# Patient Record
Sex: MALE | Race: WHITE | NOT HISPANIC OR LATINO
[De-identification: names, ages, dates, MRNs, and addresses within clinical notes are randomized per-mention and may not be internally consistent; named-entity substitution may affect disease eponyms.]

---

## 2023-06-26 ENCOUNTER — APPOINTMENT (OUTPATIENT)
Dept: PAIN MANAGEMENT | Facility: CLINIC | Age: 43
End: 2023-06-26
Payer: COMMERCIAL

## 2023-06-26 VITALS — BODY MASS INDEX: 31.5 KG/M2 | HEIGHT: 70 IN | WEIGHT: 220 LBS

## 2023-06-26 PROBLEM — Z00.00 ENCOUNTER FOR PREVENTIVE HEALTH EXAMINATION: Status: ACTIVE | Noted: 2023-06-26

## 2023-06-26 PROCEDURE — 99204 OFFICE O/P NEW MOD 45 MIN: CPT

## 2023-06-26 RX ORDER — METHYLPREDNISOLONE 4 MG/1
4 TABLET ORAL
Qty: 1 | Refills: 0 | Status: ACTIVE | COMMUNITY
Start: 2023-06-26 | End: 1900-01-01

## 2023-06-26 NOTE — DISCUSSION/SUMMARY
[de-identified] : A discussion regarding available pain management treatment options occurred with the patient.  These included interventional, rehabilitative, pharmacological, and alternative modalities. We will proceed with the following:\par \par Rehabilitative options:\par - participation in active HEP was discussed\par \par Medication based treatment options:\par Ordered Medrol dose pack \par \par Complementary treatment options:\par - lifestyle modifications discussed\par \par Image: \par Ordered Xray of the lumbar spine \par \par follow up after image\par \par I, Diane Nye, attest that this documentation has been prepared under the direction and in the presence of Provider Reddy Kapoor DO\par The documentation recorded by the scribe, in my presence, accurately reflects the service I personally performed, and the decisions made by me with my edits as appropriate.\par \par Best Regards, \par Reddy Kapoor D.O.\par \par

## 2023-06-26 NOTE — HISTORY OF PRESENT ILLNESS
[FreeTextEntry1] : HISTORY OF PRESENT ILLNESS: Mr. Boykin is a 43-year-old male complaining of low back pain. The pain started 3 days ago when he was standing up from a seated position. He reports of right low back pain that is localized and feels like a muscle spasm. The patient denies any radiating pain down legs. He states the pain is worse when standing. No pain when sitting and laying down. Patient describes the pain as severe.  During the last month the pain has been nearly constant with symptoms worsening in no typical pattern. Pain described as sharp. Pain is increased with standing, walking, exercise, coughing sneezing.  Pain is decreased with lying down, sitting, relaxation.  Pain is not changed with bowel movements.  Bowel or bladder habits have not changed. He managed this pain with Ibuprofen and Aleve. Today he rates his pain 8/10 on the pain scale \par \par ACTIVITIES: Patient could walk less than one block before the pain starts.  Patient can stand 5 minutes before pain starts.  The patient constantly lies down because of pain.  Patient uses walker at this time.  Patient has difficulty performing household chores, going to work, doing yard work or shopping, socializing with friends, participating in recreational activities and exercise at this time.\par PRIOR PAIN TREATMENTS: Moderate relief with TENS, heat treatment\par Prior Pain Medications: Motrin and Naproxen \par

## 2023-06-26 NOTE — PHYSICAL EXAM
[de-identified] : Lumbar Spine Exam:\par \par Palpation:\par Midline lumbar tenderness:             (-)\par paraspinal tenderness:                  L (-) ; R (-)\par SI joint tenderness:                        L (-) ; R (+)\par GTB tenderness:                            L (-);  R (-)\par + psis Right tenderness to palpation \par \par Special Tests:\par SLR:                           R (-) ; L (-)\par Facet loading:            R (-) ; L (-)\par ADRIAN test:               R (-) ; L (-)\par Gaenslen's:               R (-) ; L (-)\par compression test:               R (-) ; L (-)\par \par \par Gait:\par non- antalgic gait\par \par

## 2023-07-10 ENCOUNTER — APPOINTMENT (OUTPATIENT)
Dept: PAIN MANAGEMENT | Facility: CLINIC | Age: 43
End: 2023-07-10

## 2023-07-14 ENCOUNTER — APPOINTMENT (OUTPATIENT)
Dept: PAIN MANAGEMENT | Facility: CLINIC | Age: 43
End: 2023-07-14
Payer: COMMERCIAL

## 2023-07-14 DIAGNOSIS — M54.50 LOW BACK PAIN, UNSPECIFIED: ICD-10-CM

## 2023-07-14 DIAGNOSIS — M46.1 SACROILIITIS, NOT ELSEWHERE CLASSIFIED: ICD-10-CM

## 2023-07-14 PROCEDURE — 99214 OFFICE O/P EST MOD 30 MIN: CPT

## 2023-07-15 PROBLEM — M46.1 INFLAMMATION OF RIGHT SACROILIAC JOINT: Status: ACTIVE | Noted: 2023-06-26

## 2023-07-15 PROBLEM — M54.50 ACUTE RIGHT-SIDED LOW BACK PAIN WITHOUT SCIATICA: Status: ACTIVE | Noted: 2023-06-26

## 2023-07-15 NOTE — PHYSICAL EXAM
[de-identified] : Lumbar Spine Exam:\par \par Palpation:\par Midline lumbar tenderness:             (-)\par SI joint tenderness:                        L (-) ; R (+)\par GTB tenderness:                            L (-);  R (-)\par + psis Right tenderness to palpation \par \par Special Tests:\par SLR:                           R (-) ; L (-)\par \par

## 2023-07-15 NOTE — DATA REVIEWED
[FreeTextEntry1] : xray 2023 showed dextroscoliosis interpreted independently and reviewed with the patient

## 2023-07-15 NOTE — HISTORY OF PRESENT ILLNESS
[FreeTextEntry1] : HISTORY OF PRESENT ILLNESS: Mr. Boykin is a 43-year-old male complaining of low back pain. The pain started 3 days ago when he was standing up from a seated position. He reports of right low back pain that is localized and feels like a muscle spasm. The patient denies any radiating pain down legs. He states the pain is worse when standing. No pain when sitting and laying down. Patient describes the pain as severe.  During the last month the pain has been nearly constant with symptoms worsening in no typical pattern. Pain described as sharp. Pain is increased with standing, walking, exercise, coughing sneezing.  Pain is decreased with lying down, sitting, relaxation.  Pain is not changed with bowel movements.  Bowel or bladder habits have not changed. He managed this pain with Ibuprofen and Aleve. Today he rates his pain 8/10 on the pain scale \par \par ACTIVITIES: Patient could walk less than one block before the pain starts.  Patient can stand 5 minutes before pain starts.  The patient constantly lies down because of pain.  Patient uses walker at this time.  Patient has difficulty performing household chores, going to work, doing yard work or shopping, socializing with friends, participating in recreational activities and exercise at this time.\par PRIOR PAIN TREATMENTS: Moderate relief with TENS, heat treatment\par Prior Pain Medications: Motrin and Naproxen \par \par 7/14/2023\par The patient's low back pain was reduced by 90% with the medrol dose pack. Pt denies bowel/bladder incontinence, weakness, falls, unsteadiness. Pain is dull in nature worse with standing. Patient is requesting an MRI lumbar spine NC for further eval. Pain can reach an 8/10 at its worst. Patient has been doing core strengthening over the last month.\par

## 2023-07-15 NOTE — DISCUSSION/SUMMARY
[de-identified] : A discussion regarding available pain management treatment options occurred with the patient.  These included interventional, rehabilitative, pharmacological, and alternative modalities. We will proceed with the following:\par \par Rehabilitative options:\par - participation in active HEP was discussed\par \par Medication based treatment options:\par ordered mobic 15mg daily for 2 weeks. Discussed risks and benefits. Avoid taking for any side effects\par \par The patient has been having persistent lower back pain. At this point we decided to proceed with an MRI of the lumbar spine without contrast to evaluate the pathology and give the patient treatment options to help with the pain. Potential treatment options such as further conservative therapy, injections, and surgery were also briefly discussed. The medications listed below were also prescribed today. The risks and benefits of these medications were also discussed as well as the manner in which they should be taken. The patient will follow up after the MRI.\par \par f/u in 2-4 weeks for image review\par Reddy POWERSOMerrill\par \par

## 2023-07-31 RX ORDER — MELOXICAM 15 MG/1
15 TABLET ORAL DAILY
Qty: 14 | Refills: 0 | Status: ACTIVE | COMMUNITY
Start: 2023-07-14 | End: 1900-01-01

## 2023-08-13 ENCOUNTER — RX RENEWAL (OUTPATIENT)
Age: 43
End: 2023-08-13

## 2024-10-03 DIAGNOSIS — R79.89 OTHER SPECIFIED ABNORMAL FINDINGS OF BLOOD CHEMISTRY: ICD-10-CM

## 2025-04-19 PROBLEM — E78.00 HYPERCHOLESTEROLEMIA: Status: ACTIVE | Noted: 2025-04-19

## 2025-04-21 ENCOUNTER — APPOINTMENT (OUTPATIENT)
Dept: ENDOCRINOLOGY | Facility: CLINIC | Age: 45
End: 2025-04-21

## 2025-04-21 DIAGNOSIS — R79.89 OTHER SPECIFIED ABNORMAL FINDINGS OF BLOOD CHEMISTRY: ICD-10-CM

## 2025-04-21 DIAGNOSIS — E78.00 PURE HYPERCHOLESTEROLEMIA, UNSPECIFIED: ICD-10-CM
